# Patient Record
(demographics unavailable — no encounter records)

---

## 2024-10-25 NOTE — ASSESSMENT
[FreeTextEntry1] : Cough/URI x 2 wks not improving c cough suppressants and supportive measures No f/c.  No exertional component No indication for ED eval at this time

## 2024-10-25 NOTE — PHYSICAL EXAM
[de-identified] : PLEASE SEE HPI FOR ADDITIONAL RELEVANT PHYSICAL EXAM FINDINGS GENERAL: no acute distress, nontoxic HEAD: normocephalic EYES: no scleral icterus NECK: trachea midline, Full ROM RESP: no respiratory distress ABD: nondistended MSK: no gross deformity NEURO: alert & fully oriented SKIN: no rash PSYCH: cooperative, good insight, appropriate, fluent speech

## 2024-10-25 NOTE — HISTORY OF PRESENT ILLNESS
[Home] : at home, [unfilled] , at the time of the visit. [Other Location: e.g. Home (Enter Location, City,State)___] : at [unfilled] [Verbal consent obtained from patient] : the patient, [unfilled] [FreeTextEntry8] : 61 y F h/o hypothyroid c cough/coldd symptoms x 13 days Fever improving but cough is persistent Cough now causes R chest wall pain Increased fatigue No SOB, lightheadedness, n/v/d Noted that with activity, her heart rate increases to low 110s, which is somewhat unusual Stated recent ECHO+stress wnl Received flu and COVID shot prior to onset of symptoms Saw her PCP last week, given tessilon which did not help